# Patient Record
Sex: MALE
[De-identification: names, ages, dates, MRNs, and addresses within clinical notes are randomized per-mention and may not be internally consistent; named-entity substitution may affect disease eponyms.]

---

## 2022-11-18 PROBLEM — Z00.00 ENCOUNTER FOR PREVENTIVE HEALTH EXAMINATION: Status: ACTIVE | Noted: 2022-11-18

## 2022-12-05 ENCOUNTER — RESULT CHARGE (OUTPATIENT)
Age: 57
End: 2022-12-05

## 2022-12-06 ENCOUNTER — NON-APPOINTMENT (OUTPATIENT)
Age: 57
End: 2022-12-06

## 2022-12-06 ENCOUNTER — APPOINTMENT (OUTPATIENT)
Dept: CARDIOLOGY | Facility: CLINIC | Age: 57
End: 2022-12-06

## 2022-12-06 VITALS
BODY MASS INDEX: 28.61 KG/M2 | SYSTOLIC BLOOD PRESSURE: 138 MMHG | DIASTOLIC BLOOD PRESSURE: 68 MMHG | HEART RATE: 79 BPM | OXYGEN SATURATION: 98 % | HEIGHT: 66 IN | WEIGHT: 178 LBS

## 2022-12-06 VITALS — SYSTOLIC BLOOD PRESSURE: 138 MMHG | DIASTOLIC BLOOD PRESSURE: 72 MMHG

## 2022-12-06 DIAGNOSIS — Z78.9 OTHER SPECIFIED HEALTH STATUS: ICD-10-CM

## 2022-12-06 DIAGNOSIS — Z82.49 FAMILY HISTORY OF ISCHEMIC HEART DISEASE AND OTHER DISEASES OF THE CIRCULATORY SYSTEM: ICD-10-CM

## 2022-12-06 PROCEDURE — 99244 OFF/OP CNSLTJ NEW/EST MOD 40: CPT

## 2022-12-06 PROCEDURE — 93000 ELECTROCARDIOGRAM COMPLETE: CPT

## 2022-12-06 NOTE — PHYSICAL EXAM
[Well Developed] : well developed [Well Nourished] : well nourished [No Acute Distress] : no acute distress [Normal Venous Pressure] : normal venous pressure [No Carotid Bruit] : no carotid bruit [Normal S1, S2] : normal S1, S2 [No Murmur] : no murmur [No Rub] : no rub [No Gallop] : no gallop [Clear Lung Fields] : clear lung fields [Good Air Entry] : good air entry [No Respiratory Distress] : no respiratory distress  [Normal Gait] : normal gait [No Edema] : no edema [No Cyanosis] : no cyanosis [No Clubbing] : no clubbing [No Varicosities] : no varicosities [Normal Radial B/L] : normal radial B/L [Normal DP B/L] : normal DP B/L [Moves all extremities] : moves all extremities [Normal Speech] : normal speech [Alert and Oriented] : alert and oriented [de-identified] : Systolic click

## 2022-12-06 NOTE — ASSESSMENT
[FreeTextEntry1] : Reviewed on December 6, 2022.\par EKG as noted above\par Recent labs reviewed.  10-year ASCVD risk 8.1%.  Lifetime risk 46%.\par Hemoglobin A1c 5.9\par LDL cholesterol 140 HDL 46 total cholesterol 212 stable CBC CMP

## 2022-12-06 NOTE — REASON FOR VISIT
[Hyperlipidemia] : hyperlipidemia [Other: ____] : [unfilled] [FreeTextEntry3] : Dr. De La Paz [FreeTextEntry1] : 57-year-old white gentleman is referred to me for consultation.\par He is a  by occupation.  Very active.  Walks with dog 2 miles daily.\par He has no significant complaint of chest pain.  His exertional dyspnea at moderate to high level of workload.  He has no PND orthopnea pedal edema palpitations dizziness lightheadedness near syncopal syncopal event.\par He has no claudication pain\par Has not been controlling his diet for saturated fat carbohydrate intake.\par His weight and sleep pattern is stable.\par \par His medical history is mainly significant for\par Family history of coronary artery disease with father having myocardial infarction in his early 60s.\par History of elevated LDL cholesterol.\par History of hyperglycemia with hemoglobin A1c 5.9 recently suggestive of prediabetes\par \par He is non-smoker.  He has no history of myocardial infarction, coronary artery disease, cerebrovascular accident, peripheral arterial disease, rheumatic fever, thyroid or Lyme disease.\par

## 2022-12-06 NOTE — DISCUSSION/SUMMARY
[FreeTextEntry1] : 57-year-old gentleman with above medical history active medical problems as noted below\par 1.  ASCVD risk factors includes his age, gender, family history.  Prediabetes and 10-year ASCVD risk of greater than 7.5% lifetime risk of 46%.  He does have a possible mitral valve prolapse considering mitral valve click.\par Recommended\par Echocardiogram for LV ejection fraction wall motion valvular evaluation as importance of evaluation of mitral valve prolapse in relation to regurgitation/left atrial size LV function for long-term management.\par Exercise treadmill stress test\par If above test does not show any significant cardiovascular abnormality consider coronary calcium score for further restratification in presence of multiple cardiovascular risk factors.  Noncontrast.  Low-dose of radiation.  Reviewed with him.  Out-of-pocket expense discussed.\par Reviewed about risk factors with him and recommended\par Continue active lifestyle\par Dietary restrictions in the form of restriction of saturated fat carbohydrate intake.  Mediterranean type of diet.  More plant-based.\par \par Counseling regarding low saturated fat, salt and carbohydrate intake was reviewed. Active lifestyle and regular. Exercise along with weight management is advised.\par All the above were at length reviewed. Answered all the questions. Thank you very much for this kind referral. Please do not hesitate to give me a call for any question.\par Part of this transcription was done with voice recognition software and phonetically similar errors are common. I apologize for that. Please do not hesitate to call for any questions due to above.\par \par Sincerely,\par Gabi Menard MD,FACC,FASE\par

## 2023-02-02 ENCOUNTER — APPOINTMENT (OUTPATIENT)
Dept: CARDIOLOGY | Facility: CLINIC | Age: 58
End: 2023-02-02
Payer: COMMERCIAL

## 2023-02-02 PROCEDURE — 93015 CV STRESS TEST SUPVJ I&R: CPT

## 2023-02-02 PROCEDURE — 93306 TTE W/DOPPLER COMPLETE: CPT

## 2023-02-08 ENCOUNTER — APPOINTMENT (OUTPATIENT)
Dept: CARDIOLOGY | Facility: CLINIC | Age: 58
End: 2023-02-08
Payer: COMMERCIAL

## 2023-02-08 DIAGNOSIS — R73.03 PREDIABETES.: ICD-10-CM

## 2023-02-08 DIAGNOSIS — Z13.6 ENCOUNTER FOR SCREENING FOR CARDIOVASCULAR DISORDERS: ICD-10-CM

## 2023-02-08 DIAGNOSIS — E78.5 HYPERLIPIDEMIA, UNSPECIFIED: ICD-10-CM

## 2023-02-08 DIAGNOSIS — R01.1 CARDIAC MURMUR, UNSPECIFIED: ICD-10-CM

## 2023-02-08 PROCEDURE — 99214 OFFICE O/P EST MOD 30 MIN: CPT | Mod: 95

## 2023-02-08 NOTE — CARDIOLOGY SUMMARY
[de-identified] : December 6, 2022 normal sinus rhythm [de-identified] : February 2, 2023 nonischemic ETT at 10 minutes Hector protocol 11.7 METS. [de-identified] : February 2, 2023 LVEF 60%

## 2023-02-08 NOTE — DISCUSSION/SUMMARY
[FreeTextEntry1] : 57-year-old gentleman with above medical history active medical problems as noted below\par 1.  ASCVD risk factors includes his age, gender, family history.  Prediabetes and 10-year ASCVD risk of greater than 7.5% lifetime risk of 46%.  He does have a possible mitral valve prolapse considering mitral valve click.\par Recommended\par Echocardiogram and exercise treadmill stress test reviewed.  Good prognostic information discussed.  Limitations of test reviewed.  Continue aggressive lifestyle and risk factor modifications discussed.\par Recommended coronary calcium score for further restratification in presence of multiple cardiovascular risk factors.  Noncontrast.  Low-dose of radiation.  Reviewed with him.  Out-of-pocket expense discussed.\par Continue active lifestyle\par Dietary restrictions in the form of restriction of saturated fat carbohydrate intake.  Mediterranean type of diet.  More plant-based.\par 10-year ASCVD risk has been reviewed.  He wants to continue with lifestyle modifications.  Though agrees if there is evidence of atherosclerotic vascular disease he will consider statin therapy.\par \par He will contact us after the coronary calcium score so that appropriate review can be done.\par \par Counseling regarding low saturated fat, salt and carbohydrate intake was reviewed. Active lifestyle and regular. Exercise along with weight management is advised.\par All the above were at length reviewed. Answered all the questions. Thank you very much for this kind referral. Please do not hesitate to give me a call for any question.\par Part of this transcription was done with voice recognition software and phonetically similar errors are common. I apologize for that. Please do not hesitate to call for any questions due to above.\par \par Sincerely,\par Gabi Menard MD,FACC,CATIE\par

## 2023-02-08 NOTE — ASSESSMENT
[FreeTextEntry1] : Reviewed on December 6, 2022.\par EKG as noted above\par Recent labs reviewed.  10-year ASCVD risk 8.1%.  Lifetime risk 46%.\par Hemoglobin A1c 5.9\par LDL cholesterol 140 HDL 46 total cholesterol 212 stable CBC CMP\par \par Reviewed on February 8, 2023\par Echocardiogram exercise treadmill stress test reviewed

## 2023-02-08 NOTE — REASON FOR VISIT
[Hyperlipidemia] : hyperlipidemia [Other: ____] : [unfilled] [FreeTextEntry3] : Dr. De La Paz [FreeTextEntry1] : Consent: Patient consented to virtual video visit.  He is at home.  I am in [facility]\par Time: A total of 22 minutes was spent in review of pertinent medical records, discussion with the patient, evaluation of the patient problem and coordination of the care plan as part of this online visit.\par \par No physical examination was performed as this visit was performed virtually with exceptions as noted below.\par \par 57-year-old was seen recently for cardiovascular evaluation.\par He is a  by occupation.  Very active.  Walks with dog 2 miles daily.\par He has no significant complaint of chest pain.  His exertional dyspnea at moderate to high level of workload.  He has no PND orthopnea pedal edema palpitations dizziness lightheadedness near syncopal syncopal event.\par He has no claudication pain\par Has not been controlling his diet for saturated fat carbohydrate intake.\par His weight and sleep pattern is stable.\par \par His medical history is mainly significant for\par Family history of coronary artery disease with father having myocardial infarction in his early 60s.\par History of elevated LDL cholesterol.\par History of hyperglycemia with hemoglobin A1c 5.9 recently suggestive of prediabetes\par \par He is non-smoker.  He has no history of myocardial infarction, coronary artery disease, cerebrovascular accident, peripheral arterial disease, rheumatic fever, thyroid or Lyme disease.\par

## 2023-02-08 NOTE — REASON FOR VISIT
[Hyperlipidemia] : hyperlipidemia [Other: ____] : [unfilled] [FreeTextEntry3] : Dr. De La Paz [FreeTextEntry1] : Consent: Patient consented to virtual video visit.  He is at home.  I am in [facility]\par Time: A total of 22 minutes was spent in review of pertinent medical records, discussion with the patient, evaluation of the patient problem and coordination of the care plan as part of this online visit.\par \par No physical examination was performed as this visit was performed virtually with exceptions as noted below.\par \par 57-year-old was seen recently for cardiovascular evaluation.\par He is a  by occupation.  Very active.  Walks with dog 2 miles daily.\par He has no significant complaint of chest pain.  His exertional dyspnea at moderate to high level of workload.  He has no PND orthopnea pedal edema palpitations dizziness lightheadedness near syncopal syncopal event.\par He has no claudication pain\par Has not been controlling his diet for saturated fat carbohydrate intake.\par His weight and sleep pattern is stable.\par \par His medical history is mainly significant for\par Family history of coronary artery disease with father having myocardial infarction in his early 60s.\par History of elevated LDL cholesterol.\par History of hyperglycemia with hemoglobin A1c 5.9 recently suggestive of prediabetes\par \par He is non-smoker.  He has no history of myocardial infarction, coronary artery disease, cerebrovascular accident, peripheral arterial disease, rheumatic fever, thyroid or Lyme disease.\par

## 2023-02-08 NOTE — CARDIOLOGY SUMMARY
[de-identified] : December 6, 2022 normal sinus rhythm [de-identified] : February 2, 2023 nonischemic ETT at 10 minutes Hector protocol 11.7 METS. [de-identified] : February 2, 2023 LVEF 60%

## 2023-02-08 NOTE — REASON FOR VISIT
[Hyperlipidemia] : hyperlipidemia [Other: ____] : [unfilled] [FreeTextEntry3] : Dr. De La Paz [FreeTextEntry1] : Consent: Patient consented to virtual video visit.  He is at home.  I am in [facility]\par Time: A total of 22 minutes was spent in review of pertinent medical records, discussion with the patient, evaluation of the patient problem and coordination of the care plan as part of this online visit.\par \par No physical examination was performed as this visit was performed virtually with exceptions as noted below.\par \par 57-year-old was seen recently for cardiovascular evaluation.\par He is a  by occupation.  Very active.  Walks with dog 2 miles daily.\par He has no significant complaint of chest pain.  His exertional dyspnea at moderate to high level of workload.  He has no PND orthopnea pedal edema palpitations dizziness lightheadedness near syncopal syncopal event.\par He has no claudication pain\par Has not been controlling his diet for saturated fat carbohydrate intake.\par His weight and sleep pattern is stable.\par \par His medical history is mainly significant for\par Family history of coronary artery disease with father having myocardial infarction in his early 60s.\par History of elevated LDL cholesterol.\par History of hyperglycemia with hemoglobin A1c 5.9 recently suggestive of prediabetes\par \par He is non-smoker.  He has no history of myocardial infarction, coronary artery disease, cerebrovascular accident, peripheral arterial disease, rheumatic fever, thyroid or Lyme disease.\par

## 2023-02-08 NOTE — CARDIOLOGY SUMMARY
[de-identified] : December 6, 2022 normal sinus rhythm [de-identified] : February 2, 2023 nonischemic ETT at 10 minutes Hector protocol 11.7 METS. [de-identified] : February 2, 2023 LVEF 60%

## 2023-02-20 ENCOUNTER — OUTPATIENT (OUTPATIENT)
Dept: OUTPATIENT SERVICES | Facility: HOSPITAL | Age: 58
LOS: 1 days | End: 2023-02-20
Payer: COMMERCIAL

## 2023-02-20 ENCOUNTER — APPOINTMENT (OUTPATIENT)
Dept: CT IMAGING | Facility: CLINIC | Age: 58
End: 2023-02-20
Payer: COMMERCIAL

## 2023-02-20 DIAGNOSIS — E78.5 HYPERLIPIDEMIA, UNSPECIFIED: ICD-10-CM

## 2023-02-20 DIAGNOSIS — R73.03 PREDIABETES: ICD-10-CM

## 2023-02-20 DIAGNOSIS — Z13.6 ENCOUNTER FOR SCREENING FOR CARDIOVASCULAR DISORDERS: ICD-10-CM

## 2023-02-20 PROCEDURE — 75571 CT HRT W/O DYE W/CA TEST: CPT | Mod: 26

## 2023-02-20 PROCEDURE — 75571 CT HRT W/O DYE W/CA TEST: CPT

## 2023-03-06 ENCOUNTER — NON-APPOINTMENT (OUTPATIENT)
Age: 58
End: 2023-03-06

## 2024-02-16 ENCOUNTER — APPOINTMENT (OUTPATIENT)
Dept: CARDIOLOGY | Facility: CLINIC | Age: 59
End: 2024-02-16